# Patient Record
Sex: FEMALE | Race: BLACK OR AFRICAN AMERICAN | NOT HISPANIC OR LATINO | Employment: UNEMPLOYED | URBAN - METROPOLITAN AREA
[De-identification: names, ages, dates, MRNs, and addresses within clinical notes are randomized per-mention and may not be internally consistent; named-entity substitution may affect disease eponyms.]

---

## 2017-03-16 ENCOUNTER — OFFICE VISIT - HEALTHEAST (OUTPATIENT)
Dept: INTERNAL MEDICINE | Facility: CLINIC | Age: 32
End: 2017-03-16

## 2017-03-16 DIAGNOSIS — Z13.220 SCREENING FOR CHOLESTEROL LEVEL: ICD-10-CM

## 2017-03-16 DIAGNOSIS — R92.8 ABNORMAL ULTRASOUND OF BREAST: ICD-10-CM

## 2017-03-16 DIAGNOSIS — Z01.00 ROUTINE EYE EXAM: ICD-10-CM

## 2017-03-16 DIAGNOSIS — Z00.00 ROUTINE GENERAL MEDICAL EXAMINATION AT A HEALTH CARE FACILITY: ICD-10-CM

## 2017-03-16 DIAGNOSIS — Z30.015 ENCOUNTER FOR INITIAL PRESCRIPTION OF VAGINAL RING HORMONAL CONTRACEPTIVE: ICD-10-CM

## 2017-03-16 LAB
CHOLEST SERPL-MCNC: 199 MG/DL
FASTING STATUS PATIENT QL REPORTED: YES
HDLC SERPL-MCNC: 50 MG/DL
LDLC SERPL CALC-MCNC: 139 MG/DL
TRIGL SERPL-MCNC: 51 MG/DL

## 2017-03-16 ASSESSMENT — MIFFLIN-ST. JEOR: SCORE: 1437.8

## 2017-03-24 ENCOUNTER — HOSPITAL ENCOUNTER (OUTPATIENT)
Dept: MAMMOGRAPHY | Facility: CLINIC | Age: 32
Discharge: HOME | End: 2017-03-24
Attending: INTERNAL MEDICINE

## 2017-03-24 ENCOUNTER — HOSPITAL ENCOUNTER (OUTPATIENT)
Dept: ULTRASOUND IMAGING | Facility: CLINIC | Age: 32
Discharge: HOME | End: 2017-03-24
Attending: INTERNAL MEDICINE

## 2017-03-24 DIAGNOSIS — R92.8 ABNORMAL ULTRASOUND OF BREAST: ICD-10-CM

## 2017-05-23 ENCOUNTER — RECORDS - HEALTHEAST (OUTPATIENT)
Dept: ADMINISTRATIVE | Facility: OTHER | Age: 32
End: 2017-05-23

## 2018-06-27 PROBLEM — D57.3 SICKLE CELL TRAIT (H): Status: ACTIVE | Noted: 2018-06-27

## 2018-07-11 ENCOUNTER — OFFICE VISIT (OUTPATIENT)
Dept: FAMILY MEDICINE | Facility: CLINIC | Age: 33
End: 2018-07-11

## 2018-07-11 VITALS
SYSTOLIC BLOOD PRESSURE: 148 MMHG | WEIGHT: 173.38 LBS | HEART RATE: 68 BPM | TEMPERATURE: 98.4 F | OXYGEN SATURATION: 100 % | BODY MASS INDEX: 31.21 KG/M2 | DIASTOLIC BLOOD PRESSURE: 98 MMHG | RESPIRATION RATE: 16 BRPM

## 2018-07-11 DIAGNOSIS — Z01.818 PREOP GENERAL PHYSICAL EXAM: Primary | ICD-10-CM

## 2018-07-11 LAB
APTT PPP: 26 SEC (ref 22–37)
ERYTHROCYTE [DISTWIDTH] IN BLOOD BY AUTOMATED COUNT: 13.9 % (ref 10–15)
HCT VFR BLD AUTO: 39.1 % (ref 35–47)
HGB BLD-MCNC: 13.3 G/DL (ref 11.7–15.7)
INR PPP: 0.98 (ref 0.86–1.14)
MCH RBC QN AUTO: 26.5 PG (ref 26.5–33)
MCHC RBC AUTO-ENTMCNC: 34 G/DL (ref 31.5–36.5)
MCV RBC AUTO: 78 FL (ref 78–100)
PLATELET # BLD AUTO: 186 10E9/L (ref 150–450)
RBC # BLD AUTO: 5.02 10E12/L (ref 3.8–5.2)
WBC # BLD AUTO: 5.4 10E9/L (ref 4–11)

## 2018-07-11 PROCEDURE — 85610 PROTHROMBIN TIME: CPT | Performed by: FAMILY MEDICINE

## 2018-07-11 PROCEDURE — 36415 COLL VENOUS BLD VENIPUNCTURE: CPT | Performed by: FAMILY MEDICINE

## 2018-07-11 PROCEDURE — 99203 OFFICE O/P NEW LOW 30 MIN: CPT | Performed by: FAMILY MEDICINE

## 2018-07-11 PROCEDURE — 85027 COMPLETE CBC AUTOMATED: CPT | Performed by: FAMILY MEDICINE

## 2018-07-11 PROCEDURE — 80048 BASIC METABOLIC PNL TOTAL CA: CPT | Performed by: FAMILY MEDICINE

## 2018-07-11 PROCEDURE — 85730 THROMBOPLASTIN TIME PARTIAL: CPT | Performed by: FAMILY MEDICINE

## 2018-07-11 NOTE — MR AVS SNAPSHOT
After Visit Summary   7/11/2018    Zoila Forrester    MRN: [id number]           Patient Information     Date Of Birth          1985        Visit Information        Provider Department      7/11/2018 11:30 AM Jeanette López MD [facility]        Today's Diagnoses     Preop general physical exam    -  1      Care Instructions      Before Your Surgery      Call your surgeon if there is any change in your health. This includes signs of a cold or flu (such as a sore throat, runny nose, cough, rash or fever).    Do not smoke, drink alcohol or take over the counter medicine (unless your surgeon or primary care doctor tells you to) for the 24 hours before and after surgery.    If you take prescribed drugs: Follow your doctor s orders about which medicines to take and which to stop until after surgery. Avoid any NSAIDS at last 7-10 days prior to surgery. Tylenol only for pain.     Eating and drinking prior to surgery: follow the instructions from your surgeon    Take a shower or bath the night before surgery. Use the soap your surgeon gave you to gently clean your skin. If you do not have soap from your surgeon, use your regular soap. Do not shave or scrub the surgery site.  Wear clean pajamas and have clean sheets on your bed.           Follow-ups after your visit        Who to contact     If you have questions or need follow up information about today's clinic visit or your schedule please contact [facility] directly at [phone].  Normal or non-critical lab and imaging results will be communicated to you by MyChart, letter or phone within 4 business days after the clinic has received the results. If you do not hear from us within 7 days, please contact the clinic through MyChart or phone. If you have a critical or abnormal lab result, we will notify you by phone as soon as possible.  Submit refill requests through [employer] or call your pharmacy and  "they will forward the refill request to us. Please allow 3 business days for your refill to be completed.          Additional Information About Your Visit        [employer]a-Sha Information     [employer] lets you send messages to your doctor, view your test results, renew your prescriptions, schedule appointments and more. To sign up, go to [email or web address] . Click on \"Log in\" on the left side of the screen, which will take you to the Welcome page. Then click on \"Sign up Now\" on the right side of the page.     You will be asked to enter the access code listed below, as well as some personal information. Please follow the directions to create your username and password.     Your access code is: [email or web address]  Expires: 2018 12:24 PM     Your access code will  in 90 days. If you need help or a new code, please call your [facility] clinic or [phone].        Care EveryWhere ID     This is your [facility] EveryWhere ID. This could be used by other organizations to access your [facility] medical records  [id number]        Your Vitals Were     Pulse Temperature Respirations Last Period Pulse Oximetry BMI (Body Mass Index)    68 98.4  F (36.9  C) (Oral) 16 2018 (Exact Date) 100% 31.21 kg/m2       Blood Pressure from Last 3 Encounters:   18 (!) 148/98   18 (!) 71/53    Weight from Last 3 Encounters:   18 173 lb 6 oz (78.6 kg)   18 172 lb (78 kg)              We Performed the Following     Basic metabolic panel     CBC with platelets     INR     Partial thromboplastin time        Primary Care Provider Office Phone # Fax #    [facility] [phone] [address] [facility] [address]        Equal Access to Services     JESSICA NUNES : Nehal Corrales, ailyn paulson, marylin hammalaris nix. So [facility] [phone].    ATENCIÓN: Si habla español, tiene a moncada disposición servicios gratuitos de " asistencia lingüística. Tavo al [phone].    We comply with applicable federal civil rights laws and [place] laws. We do not discriminate on the basis of race, color, national origin, age, disability, sex, sexual orientation, or gender identity.            Thank you!     Thank you for choosing [facility]  for your care. Our goal is always to provide you with excellent care. Hearing back from our patients is one way we can continue to improve our services. Please take a few minutes to complete the written survey that you may receive in the mail after your visit with us. Thank you!             Your Updated Medication List - Protect others around you: Learn how to safely use, store and throw away your medicines at www.disposemymeds.org.      Notice  As of 7/11/2018 11:44 AM    You have not been prescribed any medications.

## 2018-07-11 NOTE — PATIENT INSTRUCTIONS
Before Your Surgery      Call your surgeon if there is any change in your health. This includes signs of a cold or flu (such as a sore throat, runny nose, cough, rash or fever).    Do not smoke, drink alcohol or take over the counter medicine (unless your surgeon or primary care doctor tells you to) for the 24 hours before and after surgery.    If you take prescribed drugs: Follow your doctor s orders about which medicines to take and which to stop until after surgery. Avoid any NSAIDS at last 7-10 days prior to surgery. Tylenol only for pain.     Eating and drinking prior to surgery: follow the instructions from your surgeon    Take a shower or bath the night before surgery. Use the soap your surgeon gave you to gently clean your skin. If you do not have soap from your surgeon, use your regular soap. Do not shave or scrub the surgery site.  Wear clean pajamas and have clean sheets on your bed.

## 2018-07-11 NOTE — PROGRESS NOTES
[facility]  [address] [address] [address]  [phone]  Dept: [phone]    PRE-OP EVALUATION:  Today's date: 2018    Zoila Forrester (: 1985) presents for pre-operative evaluation assessment as requested by Dr. Gonzales.  She requires evaluation and anesthesia risk assessment prior to undergoing surgery/procedure for treatment of liposuction .    Fax number for surgical facility:   Primary Physician: [facility]  Type of Anesthesia Anticipated: to be determined    Patient has a Health Care Directive or Living Will:  NO    Preop Questions 2018   Who is doing your surgery? Dr Gonzales   What are you having done? liposuction   Date of Surgery/Procedure: 18   1.  Do you have a history of Heart attack, stroke, stent, coronary bypass surgery, or other heart surgery? No   2.  Do you ever have any pain or discomfort in your chest? No   3.  Do you have a history of  Heart Failure? No   4.   Are you troubled by shortness of breath when:  walking on a level surface, or up a slight hill, or at night? No   5.  Do you currently have a cold, bronchitis or other respiratory infection? No   6.  Do you have a cough, shortness of breath, or wheezing? No   7.  Do you sometimes get pains in the calves of your legs when you walk? No   8. Do you or anyone in your family have previous history of blood clots? No   9.  Do you or does anyone in your family have a serious bleeding problem such as prolonged bleeding following surgeries or cuts? No   10. Have you ever had problems with anemia or been told to take iron pills? No   11. Have you had any abnormal blood loss such as black, tarry or bloody stools, or abnormal vaginal bleeding? No   12. Have you ever had a blood transfusion? No   13. Have you or any of your relatives ever had problems with anesthesia? No   14. Do you have sleep apnea, excessive snoring or daytime drowsiness? No   15. Do you have  any prosthetic heart valves? No   16. Do you have prosthetic joints? No   17. Is there any chance that you may be pregnant? No         HPI:     HPI related to upcoming procedure: liposuction       See problem list for active medical problems.  Problems all longstanding and stable, except as noted/documented.  See ROS for pertinent symptoms related to these conditions.                                                                                                                                                          .    MEDICAL HISTORY:     Patient Active Problem List    Diagnosis Date Noted     Sickle cell trait (H) 06/27/2018     Priority: Medium     Overview:   Created by Conversion        History reviewed. No pertinent past medical history.  History reviewed. No pertinent surgical history.  No current outpatient prescriptions on file.     OTC products: no recent use of OTC ASA, NSAIDS or Steroids    No Known Allergies   Latex Allergy: NO    Social History   Substance Use Topics     Smoking status: Never Smoker     Smokeless tobacco: Never Used     Alcohol use No     History   Drug Use No       REVIEW OF SYSTEMS:   Constitutional, neuro, ENT, endocrine, pulmonary, cardiac, gastrointestinal, genitourinary, musculoskeletal, integument and psychiatric systems are negative, except as otherwise noted.    EXAM:   BP (!) 148/98 (BP Location: Left arm, Patient Position: Chair, Cuff Size: Adult Regular)  Pulse 68  Temp 98.4  F (36.9  C) (Oral)  Resp 16  Wt 173 lb 6 oz (78.6 kg)  LMP 06/25/2018 (Exact Date)  SpO2 100%  BMI 31.21 kg/m2    GENERAL APPEARANCE: healthy, alert and no distress     EYES: EOMI, PERRL     HENT: ear canals and TM's normal and nose and mouth without ulcers or lesions     NECK: no adenopathy, no asymmetry, masses, or scars and thyroid normal to palpation     RESP: lungs clear to auscultation - no rales, rhonchi or wheezes     CV: regular rates and rhythm, normal S1 S2, no S3 or S4 and no  murmur, click or rub     ABDOMEN:  soft, nontender, no HSM or masses and bowel sounds normal     MS: extremities normal- no gross deformities noted, no evidence of inflammation in joints, FROM in all extremities.     SKIN: acne     NEURO: Normal strength and tone, sensory exam grossly normal, mentation intact and speech normal     PSYCH: mentation appears normal. and affect normal/bright     LYMPHATICS: No cervical adenopathy    DIAGNOSTICS:   EKG: Not indicated due to non-vascular surgery and low risk of event (age <65 and without cardiac risk factors)    No results for input(s): HGB, PLT, INR, NA, POTASSIUM, CR, A1C in the last 46327 hours.     IMPRESSION:   Reason for surgery/procedure: Liposuction   Diagnosis/reason for consult: Pre-operative consult     The proposed surgical procedure is considered INTERMEDIATE risk.    REVISED CARDIAC RISK INDEX  The patient has the following serious cardiovascular risks for perioperative complications such as (MI, PE, VFib and 3  AV Block):  No serious cardiac risks  INTERPRETATION: 0 risks: Class I (very low risk - 0.4% complication rate)    The patient has the following additional risks for perioperative complications:  No identified additional risks      ICD-10-CM    1. Preop general physical exam Z01.818 Basic metabolic panel     CBC with platelets       RECOMMENDATIONS:     APPROVAL GIVEN to proceed with proposed procedure, without further diagnostic evaluation       Signed Electronically by: Jeanette López MD    Copy of this evaluation report is provided to requesting physician.    [place] Preop Guidelines    Revised Cardiac Risk Index

## 2018-07-11 NOTE — LETTER
July 12, 2018      Zoila Usama  [address] [facility] [address]        Dear ,    We are writing to inform you of your test results.    Your test results fall within the expected range(s) or remain unchanged from previous results.  Please continue with current treatment plan.    Resulted Orders   Basic metabolic panel   Result Value Ref Range    Sodium 139 133 - 144 mmol/L    Potassium 3.8 3.4 - 5.3 mmol/L    Chloride 104 94 - 109 mmol/L    Carbon Dioxide 28 20 - 32 mmol/L    Anion Gap 7 3 - 14 mmol/L    Glucose 97 70 - 99 mg/dL    Urea Nitrogen 10 7 - 30 mg/dL    Creatinine 0.72 0.52 - 1.04 mg/dL    GFR Estimate >90 >60 mL/min/1.7m2      Comment:      Non  GFR Calc    GFR Estimate If Black >90 >60 mL/min/1.7m2      Comment:       GFR Calc    Calcium 8.7 8.5 - 10.1 mg/dL   CBC with platelets   Result Value Ref Range    WBC 5.4 4.0 - 11.0 10e9/L    RBC Count 5.02 3.8 - 5.2 10e12/L    Hemoglobin 13.3 11.7 - 15.7 g/dL    Hematocrit 39.1 35.0 - 47.0 %    MCV 78 78 - 100 fl    MCH 26.5 26.5 - 33.0 pg    MCHC 34.0 31.5 - 36.5 g/dL    RDW 13.9 10.0 - 15.0 %    Platelet Count 186 150 - 450 10e9/L   Partial thromboplastin time   Result Value Ref Range    PTT 26 22 - 37 sec   INR   Result Value Ref Range    INR 0.98 0.86 - 1.14       If you have any questions or concerns, please call the clinic at the number listed above.       Sincerely,        Jeanette López MD/дмитрий

## 2018-07-12 LAB
ANION GAP SERPL CALCULATED.3IONS-SCNC: 7 MMOL/L (ref 3–14)
BUN SERPL-MCNC: 10 MG/DL (ref 7–30)
CALCIUM SERPL-MCNC: 8.7 MG/DL (ref 8.5–10.1)
CHLORIDE SERPL-SCNC: 104 MMOL/L (ref 94–109)
CO2 SERPL-SCNC: 28 MMOL/L (ref 20–32)
CREAT SERPL-MCNC: 0.72 MG/DL (ref 0.52–1.04)
GFR SERPL CREATININE-BSD FRML MDRD: >90 ML/MIN/1.7M2
GLUCOSE SERPL-MCNC: 97 MG/DL (ref 70–99)
POTASSIUM SERPL-SCNC: 3.8 MMOL/L (ref 3.4–5.3)
SODIUM SERPL-SCNC: 139 MMOL/L (ref 133–144)

## 2018-07-12 NOTE — PROGRESS NOTES
Please send patient a letter to let them know results are normal.    Please fax pre-op and labs to Dr. young's office for liposuction.     Dr. Ordaz

## 2018-11-15 ENCOUNTER — OFFICE VISIT - HEALTHEAST (OUTPATIENT)
Dept: INTERNAL MEDICINE | Facility: CLINIC | Age: 33
End: 2018-11-15

## 2018-11-15 DIAGNOSIS — I10 ESSENTIAL HYPERTENSION: ICD-10-CM

## 2018-11-15 DIAGNOSIS — Z00.00 ROUTINE GENERAL MEDICAL EXAMINATION AT A HEALTH CARE FACILITY: ICD-10-CM

## 2018-11-15 DIAGNOSIS — D24.2 BREAST ADENOMA, LEFT: ICD-10-CM

## 2018-11-15 DIAGNOSIS — E78.00 ELEVATED CHOLESTEROL: ICD-10-CM

## 2018-11-15 LAB
ALBUMIN SERPL-MCNC: 3.9 G/DL (ref 3.5–5)
ALP SERPL-CCNC: 51 U/L (ref 45–120)
ALT SERPL W P-5'-P-CCNC: 21 U/L (ref 0–45)
ANION GAP SERPL CALCULATED.3IONS-SCNC: 10 MMOL/L (ref 5–18)
AST SERPL W P-5'-P-CCNC: 22 U/L (ref 0–40)
ATRIAL RATE - MUSE: 68 BPM
BILIRUB SERPL-MCNC: 0.3 MG/DL (ref 0–1)
BUN SERPL-MCNC: 9 MG/DL (ref 8–22)
CALCIUM SERPL-MCNC: 9.4 MG/DL (ref 8.5–10.5)
CHLORIDE BLD-SCNC: 106 MMOL/L (ref 98–107)
CHOLEST SERPL-MCNC: 192 MG/DL
CO2 SERPL-SCNC: 23 MMOL/L (ref 22–31)
CREAT SERPL-MCNC: 0.72 MG/DL (ref 0.6–1.1)
DIASTOLIC BLOOD PRESSURE - MUSE: NORMAL MMHG
FASTING STATUS PATIENT QL REPORTED: YES
GFR SERPL CREATININE-BSD FRML MDRD: >60 ML/MIN/1.73M2
GLUCOSE BLD-MCNC: 86 MG/DL (ref 70–125)
HDLC SERPL-MCNC: 50 MG/DL
INTERPRETATION ECG - MUSE: NORMAL
LDLC SERPL CALC-MCNC: 128 MG/DL
P AXIS - MUSE: 61 DEGREES
POTASSIUM BLD-SCNC: 4.1 MMOL/L (ref 3.5–5)
PR INTERVAL - MUSE: 140 MS
PROT SERPL-MCNC: 7.5 G/DL (ref 6–8)
QRS DURATION - MUSE: 74 MS
QT - MUSE: 378 MS
QTC - MUSE: 401 MS
R AXIS - MUSE: 38 DEGREES
SODIUM SERPL-SCNC: 139 MMOL/L (ref 136–145)
SYSTOLIC BLOOD PRESSURE - MUSE: NORMAL MMHG
T AXIS - MUSE: 24 DEGREES
TRIGL SERPL-MCNC: 69 MG/DL
TSH SERPL DL<=0.005 MIU/L-ACNC: 1.06 UIU/ML (ref 0.3–5)
VENTRICULAR RATE- MUSE: 68 BPM

## 2018-11-15 ASSESSMENT — MIFFLIN-ST. JEOR: SCORE: 1437.06

## 2018-11-26 ENCOUNTER — HOSPITAL ENCOUNTER (OUTPATIENT)
Dept: MAMMOGRAPHY | Facility: CLINIC | Age: 33
Discharge: HOME | End: 2018-11-26
Attending: INTERNAL MEDICINE

## 2018-11-26 ENCOUNTER — HOSPITAL ENCOUNTER (OUTPATIENT)
Dept: ULTRASOUND IMAGING | Facility: CLINIC | Age: 33
Discharge: HOME | End: 2018-11-26
Attending: INTERNAL MEDICINE

## 2018-11-26 DIAGNOSIS — D24.2 BREAST ADENOMA, LEFT: ICD-10-CM

## 2019-01-31 ENCOUNTER — OFFICE VISIT - HEALTHEAST (OUTPATIENT)
Dept: FAMILY MEDICINE | Facility: CLINIC | Age: 34
End: 2019-01-31

## 2019-01-31 DIAGNOSIS — S51.812D LACERATION OF LEFT FOREARM, SUBSEQUENT ENCOUNTER: ICD-10-CM

## 2019-01-31 DIAGNOSIS — Z48.02 ENCOUNTER FOR REMOVAL OF SUTURES: ICD-10-CM

## 2019-05-01 ENCOUNTER — COMMUNICATION - HEALTHEAST (OUTPATIENT)
Dept: INTERNAL MEDICINE | Facility: CLINIC | Age: 34
End: 2019-05-01

## 2019-05-01 DIAGNOSIS — I10 ESSENTIAL HYPERTENSION: ICD-10-CM

## 2019-05-16 ENCOUNTER — OFFICE VISIT (OUTPATIENT)
Dept: INTERNAL MEDICINE | Facility: CLINIC | Age: 34
End: 2019-05-16
Payer: COMMERCIAL

## 2019-05-16 VITALS
SYSTOLIC BLOOD PRESSURE: 145 MMHG | DIASTOLIC BLOOD PRESSURE: 98 MMHG | HEART RATE: 92 BPM | RESPIRATION RATE: 14 BRPM | HEIGHT: 63 IN | OXYGEN SATURATION: 97 % | TEMPERATURE: 99.1 F | WEIGHT: 177 LBS | BODY MASS INDEX: 31.36 KG/M2

## 2019-05-16 DIAGNOSIS — Z01.818 PREOP GENERAL PHYSICAL EXAM: Primary | ICD-10-CM

## 2019-05-16 LAB — HGB BLD-MCNC: 12 G/DL (ref 11.7–15.7)

## 2019-05-16 PROCEDURE — 99214 OFFICE O/P EST MOD 30 MIN: CPT | Performed by: FAMILY MEDICINE

## 2019-05-16 PROCEDURE — 36415 COLL VENOUS BLD VENIPUNCTURE: CPT | Performed by: FAMILY MEDICINE

## 2019-05-16 PROCEDURE — 85018 HEMOGLOBIN: CPT | Performed by: FAMILY MEDICINE

## 2019-05-16 ASSESSMENT — MIFFLIN-ST. JEOR: SCORE: 1469.06

## 2019-05-16 NOTE — PROGRESS NOTES
[facility]  [address]  [facility] [address]  [phone]  Dept: [phone]    PRE-OP EVALUATION:  Today's date: 2019    Zoila Mcneal (: 1985) presents for pre-operative evaluation assessment as requested by Dr. Abisai Hebert.  She requires evaluation and anesthesia risk assessment prior to undergoing surgery/procedure for treatment of abdominoplasty .    Fax number for surgical facility: [phone]  [facility]  Type of Anesthesia Anticipated: to be determined    Patient has a Health Care Directive or Living Will:  NO    Preop Questions 2019   Who is doing your surgery? abdominal plasty   What are you having done? great   Date of Surgery/Procedure: 24th may 2019   Facility or Hospital where procedure/surgery will be performed: [facility]   1.  Do you have a history of Heart attack, stroke, stent, coronary bypass surgery, or other heart surgery? No   2.  Do you ever have any pain or discomfort in your chest? No   3.  Do you have a history of  Heart Failure? No   4.   Are you troubled by shortness of breath when:  walking on a level surface, or up a slight hill, or at night? No   5.  Do you currently have a cold, bronchitis or other respiratory infection? No   6.  Do you have a cough, shortness of breath, or wheezing? No   7.  Do you sometimes get pains in the calves of your legs when you walk? No   8. Do you or anyone in your family have previous history of blood clots? No   9.  Do you or does anyone in your family have a serious bleeding problem such as prolonged bleeding following surgeries or cuts? No   10. Have you ever had problems with anemia or been told to take iron pills? No   11. Have you had any abnormal blood loss such as black, tarry or bloody stools, or abnormal vaginal bleeding? No   12. Have you ever had a blood transfusion? No   13. Have you or any of your relatives ever had problems with  anesthesia? No   14. Do you have sleep apnea, excessive snoring or daytime drowsiness? No   15. Do you have any prosthetic heart valves? No   16. Do you have prosthetic joints? No   17. Is there any chance that you may be pregnant? No         HPI:     HPI related to upcoming procedure: pt would have abdominoplasty for cosmetic reasons       See problem list for active medical problems.  Problems all longstanding and stable, except as noted/documented.  See ROS for pertinent symptoms related to these conditions.                                                                                                                                                          .    MEDICAL HISTORY:     Patient Active Problem List    Diagnosis Date Noted     Sickle cell trait (H) 06/27/2018     Priority: Medium     Overview:   Created by Conversion        History reviewed. No pertinent past medical history.  History reviewed. No pertinent surgical history.  No current outpatient medications on file.     OTC products: no recent use of OTC ASA, NSAIDS or Steroids    No Known Allergies   Latex Allergy: NO    Social History     Tobacco Use     Smoking status: Never Smoker     Smokeless tobacco: Never Used   Substance Use Topics     Alcohol use: No     History   Drug Use No       REVIEW OF SYSTEMS:   CONSTITUTIONAL: NEGATIVE for fever, chills, change in weight  INTEGUMENTARY/SKIN: NEGATIVE for worrisome rashes, moles or lesions  EYES: NEGATIVE for vision changes or irritation  ENT/MOUTH: NEGATIVE for ear, mouth and throat problems  RESP: NEGATIVE for significant cough or SOB  BREAST: NEGATIVE for masses, tenderness or discharge  CV: NEGATIVE for chest pain, palpitations or peripheral edema  GI: NEGATIVE for nausea, abdominal pain, heartburn, or change in bowel habits  : NEGATIVE for frequency, dysuria, or hematuria  MUSCULOSKELETAL: NEGATIVE for significant arthralgias or myalgia  NEURO: NEGATIVE for weakness, dizziness or  "paresthesias  ENDOCRINE: NEGATIVE for temperature intolerance, skin/hair changes  HEME: NEGATIVE for bleeding problems  PSYCHIATRIC: NEGATIVE for changes in mood or affect    EXAM:   BP (!) 145/98 (BP Location: Right arm, Patient Position: Sitting, Cuff Size: Adult Large)   Pulse 92   Temp 99.1  F (37.3  C) (Oral)   Resp 14   Ht 1.588 m (5' 2.5\")   Wt 80.3 kg (177 lb)   LMP 05/05/2019 (Approximate)   SpO2 97%   Breastfeeding? No   BMI 31.86 kg/m      GENERAL APPEARANCE: healthy, alert and no distress     EYES: EOMI, PERRL     HENT: ear canals and TM's normal and nose and mouth without ulcers or lesions     NECK: no adenopathy, no asymmetry, masses, or scars and thyroid normal to palpation     RESP: lungs clear to auscultation - no rales, rhonchi or wheezes     CV: regular rates and rhythm, normal S1 S2, no S3 or S4 and no murmur, click or rub     ABDOMEN:  soft, nontender, no HSM or masses and bowel sounds normal     MS: extremities normal- no gross deformities noted, no evidence of inflammation in joints, FROM in all extremities.     SKIN: no suspicious lesions or rashes     NEURO: Normal strength and tone, sensory exam grossly normal, mentation intact and speech normal     PSYCH: mentation appears normal. and affect normal/bright     LYMPHATICS: No cervical adenopathy    DIAGNOSTICS:   Hemoglobin   Recent Labs   Lab Test 07/11/18  1153   HGB 13.3      INR 0.98      POTASSIUM 3.8   CR 0.72        IMPRESSION:   Reason for surgery/procedure: cosmetic surgery  Diagnosis/reason for consult:preop prior to abdominoplasty    The proposed surgical procedure is considered INTERMEDIATE risk.    REVISED CARDIAC RISK INDEX  The patient has the following serious cardiovascular risks for perioperative complications such as (MI, PE, VFib and 3  AV Block):  No serious cardiac risks  INTERPRETATION: 0 risks: Class I (very low risk - 0.4% complication rate)    The patient has the following additional risks for " perioperative complications:  No identified additional risks      ICD-10-CM    1. Preop general physical exam Z01.818        RECOMMENDATIONS:     --Consult hospital rounder / IM to assist post-op medical management    --Patient is to take all scheduled medications on the day of surgery EXCEPT for modifications listed below.    APPROVAL GIVEN to proceed with proposed procedure, without further diagnostic evaluation     Hemoglobin -  Results for orders placed or performed in visit on 05/16/19   Hemoglobin   Result Value Ref Range    Hemoglobin 12.0 11.7 - 15.7 g/dL       Signed Electronically by: Amirah Cahn MD    Copy of this evaluation report is provided to requesting physician.    [place] Preop Guidelines    Revised Cardiac Risk Index

## 2019-05-22 DIAGNOSIS — Z01.818 PRE-OPERATIVE EXAMINATION: Primary | ICD-10-CM

## 2019-05-22 PROCEDURE — 36415 COLL VENOUS BLD VENIPUNCTURE: CPT | Performed by: PLASTIC SURGERY

## 2019-05-22 PROCEDURE — 84132 ASSAY OF SERUM POTASSIUM: CPT | Performed by: PLASTIC SURGERY

## 2019-05-23 LAB — POTASSIUM SERPL-SCNC: 3.4 MMOL/L (ref 3.4–5.3)

## 2019-05-24 ENCOUNTER — NURSE TRIAGE (OUTPATIENT)
Dept: NURSING | Facility: CLINIC | Age: 34
End: 2019-05-24

## 2020-03-02 ENCOUNTER — OFFICE VISIT - HEALTHEAST (OUTPATIENT)
Dept: INTERNAL MEDICINE | Facility: CLINIC | Age: 35
End: 2020-03-02

## 2020-03-02 DIAGNOSIS — Z11.51 SPECIAL SCREENING EXAMINATION FOR HUMAN PAPILLOMAVIRUS (HPV): ICD-10-CM

## 2020-03-02 DIAGNOSIS — Z13.228 SCREENING FOR ENDOCRINE, NUTRITIONAL, METABOLIC AND IMMUNITY DISORDER: ICD-10-CM

## 2020-03-02 DIAGNOSIS — Z13.0 SCREENING FOR ENDOCRINE, NUTRITIONAL, METABOLIC AND IMMUNITY DISORDER: ICD-10-CM

## 2020-03-02 DIAGNOSIS — Z13.220 SCREENING FOR CHOLESTEROL LEVEL: ICD-10-CM

## 2020-03-02 DIAGNOSIS — Z00.00 ENCOUNTER FOR HEALTH MAINTENANCE EXAMINATION IN ADULT: ICD-10-CM

## 2020-03-02 DIAGNOSIS — N63.0 FIBROUS BREAST LUMPS: ICD-10-CM

## 2020-03-02 DIAGNOSIS — Z13.29 SCREENING FOR ENDOCRINE, NUTRITIONAL, METABOLIC AND IMMUNITY DISORDER: ICD-10-CM

## 2020-03-02 DIAGNOSIS — Z13.21 SCREENING FOR ENDOCRINE, NUTRITIONAL, METABOLIC AND IMMUNITY DISORDER: ICD-10-CM

## 2020-03-02 DIAGNOSIS — Z86.79 HISTORY OF HYPERTENSION: ICD-10-CM

## 2020-03-02 LAB
ALBUMIN SERPL-MCNC: 3.8 G/DL (ref 3.5–5)
ALP SERPL-CCNC: 48 U/L (ref 45–120)
ALT SERPL W P-5'-P-CCNC: 22 U/L (ref 0–45)
ANION GAP SERPL CALCULATED.3IONS-SCNC: 8 MMOL/L (ref 5–18)
AST SERPL W P-5'-P-CCNC: 18 U/L (ref 0–40)
BASOPHILS # BLD AUTO: 0 THOU/UL (ref 0–0.2)
BASOPHILS NFR BLD AUTO: 1 % (ref 0–2)
BILIRUB SERPL-MCNC: 0.3 MG/DL (ref 0–1)
BUN SERPL-MCNC: 7 MG/DL (ref 8–22)
CALCIUM SERPL-MCNC: 9.3 MG/DL (ref 8.5–10.5)
CHLORIDE BLD-SCNC: 104 MMOL/L (ref 98–107)
CHOLEST SERPL-MCNC: 179 MG/DL
CO2 SERPL-SCNC: 26 MMOL/L (ref 22–31)
CREAT SERPL-MCNC: 0.76 MG/DL (ref 0.6–1.1)
EOSINOPHIL # BLD AUTO: 0.1 THOU/UL (ref 0–0.4)
EOSINOPHIL NFR BLD AUTO: 1 % (ref 0–6)
ERYTHROCYTE [DISTWIDTH] IN BLOOD BY AUTOMATED COUNT: 13.9 % (ref 11–14.5)
FASTING STATUS PATIENT QL REPORTED: YES
GFR SERPL CREATININE-BSD FRML MDRD: >60 ML/MIN/1.73M2
GLUCOSE BLD-MCNC: 88 MG/DL (ref 70–125)
HCT VFR BLD AUTO: 38.4 % (ref 35–47)
HDLC SERPL-MCNC: 51 MG/DL
HGB BLD-MCNC: 12.5 G/DL (ref 12–16)
LDLC SERPL CALC-MCNC: 117 MG/DL
LYMPHOCYTES # BLD AUTO: 2.2 THOU/UL (ref 0.8–4.4)
LYMPHOCYTES NFR BLD AUTO: 46 % (ref 20–40)
MCH RBC QN AUTO: 25.3 PG (ref 27–34)
MCHC RBC AUTO-ENTMCNC: 32.6 G/DL (ref 32–36)
MCV RBC AUTO: 78 FL (ref 80–100)
MONOCYTES # BLD AUTO: 0.3 THOU/UL (ref 0–0.9)
MONOCYTES NFR BLD AUTO: 7 % (ref 2–10)
NEUTROPHILS # BLD AUTO: 2.2 THOU/UL (ref 2–7.7)
NEUTROPHILS NFR BLD AUTO: 45 % (ref 50–70)
PLATELET # BLD AUTO: 182 THOU/UL (ref 140–440)
PMV BLD AUTO: 12.8 FL (ref 8.5–12.5)
POTASSIUM BLD-SCNC: 4.2 MMOL/L (ref 3.5–5)
PROT SERPL-MCNC: 7.4 G/DL (ref 6–8)
RBC # BLD AUTO: 4.94 MILL/UL (ref 3.8–5.4)
SODIUM SERPL-SCNC: 138 MMOL/L (ref 136–145)
TRIGL SERPL-MCNC: 54 MG/DL
TSH SERPL DL<=0.005 MIU/L-ACNC: 1.2 UIU/ML (ref 0.3–5)
WBC: 4.8 THOU/UL (ref 4–11)

## 2020-03-02 ASSESSMENT — MIFFLIN-ST. JEOR: SCORE: 1404.03

## 2020-03-03 LAB
HPV SOURCE: NORMAL
HUMAN PAPILLOMA VIRUS 16 DNA: NEGATIVE
HUMAN PAPILLOMA VIRUS 18 DNA: NEGATIVE
HUMAN PAPILLOMA VIRUS FINAL DIAGNOSIS: NORMAL
HUMAN PAPILLOMA VIRUS OTHER HR: NEGATIVE
SPECIMEN DESCRIPTION: NORMAL

## 2020-05-13 ENCOUNTER — COMMUNICATION - HEALTHEAST (OUTPATIENT)
Dept: INTERNAL MEDICINE | Facility: CLINIC | Age: 35
End: 2020-05-13

## 2020-05-13 DIAGNOSIS — I10 ESSENTIAL HYPERTENSION: ICD-10-CM

## 2020-06-26 ENCOUNTER — COMMUNICATION - HEALTHEAST (OUTPATIENT)
Dept: INTERNAL MEDICINE | Facility: CLINIC | Age: 35
End: 2020-06-26

## 2020-06-26 DIAGNOSIS — I10 ESSENTIAL HYPERTENSION: ICD-10-CM

## 2020-07-08 ENCOUNTER — COMMUNICATION - HEALTHEAST (OUTPATIENT)
Dept: INTERNAL MEDICINE | Facility: CLINIC | Age: 35
End: 2020-07-08

## 2020-07-08 DIAGNOSIS — I10 ESSENTIAL HYPERTENSION: ICD-10-CM

## 2020-07-09 RX ORDER — CHLORTHALIDONE 25 MG/1
25 TABLET ORAL DAILY
Qty: 90 TABLET | Refills: 3 | Status: SHIPPED | OUTPATIENT
Start: 2020-07-09

## 2020-07-14 RX ORDER — CHLORTHALIDONE 25 MG/1
TABLET ORAL
Qty: 90 TABLET | Refills: 1 | Status: SHIPPED | OUTPATIENT
Start: 2020-07-14

## 2020-07-29 ENCOUNTER — HOSPITAL ENCOUNTER (OUTPATIENT)
Dept: MAMMOGRAPHY | Facility: CLINIC | Age: 35
Discharge: HOME | End: 2020-07-29

## 2020-07-29 ENCOUNTER — HOSPITAL ENCOUNTER (OUTPATIENT)
Dept: ULTRASOUND IMAGING | Facility: CLINIC | Age: 35
Discharge: HOME | End: 2020-07-29

## 2020-07-29 DIAGNOSIS — N63.0 FIBROUS BREAST LUMPS: ICD-10-CM

## 2020-07-30 ENCOUNTER — COMMUNICATION - HEALTHEAST (OUTPATIENT)
Dept: INTERNAL MEDICINE | Facility: CLINIC | Age: 35
End: 2020-07-30

## 2021-05-28 NOTE — TELEPHONE ENCOUNTER
Medication Question or Clarification  Who is calling: Patient  What medication are you calling about? (include dose and sig) chlorthalidone 25 mg  Who prescribed the medication?: Dr Lena Castillo  What is your question/concern?: The patient has not been taking this medication daily.  She only takes it if she feels uneasy or b/p systolic is greater then 140.  She is asking for a refill.  Please advise.   Pharmacy: [facility]  Okay to leave a detailed message?: No  Site CMT - Please call the pharmacy to obtain any additional needed information.

## 2021-05-30 VITALS — HEIGHT: 63 IN | BODY MASS INDEX: 30.22 KG/M2 | WEIGHT: 170.56 LBS

## 2021-06-02 VITALS — BODY MASS INDEX: 30.19 KG/M2 | WEIGHT: 170.4 LBS | HEIGHT: 63 IN

## 2021-06-02 VITALS — WEIGHT: 164 LBS | BODY MASS INDEX: 29.05 KG/M2

## 2021-06-04 VITALS
HEIGHT: 63 IN | BODY MASS INDEX: 29.1 KG/M2 | SYSTOLIC BLOOD PRESSURE: 144 MMHG | WEIGHT: 164.25 LBS | DIASTOLIC BLOOD PRESSURE: 80 MMHG

## 2021-06-08 NOTE — TELEPHONE ENCOUNTER
chlorthalidone (HYGROTEN) 25 MG tablet [[id number]]     Electronically signed by: Rolanda Villegas MD on 05/01/19 1423  Status: Discontinued    Ordering user: Rolanda Villegas MD 05/01/19 1423  Authorized by: Rolanda Villegas MD    Frequency: DAILY 05/01/19 - 03/02/20  Released by: Rolanda Villegas MD 05/01/19 1423    Discontinued by: Palak Ann, CNP 03/02/20 1011 [Therapy completed]

## 2021-06-09 NOTE — TELEPHONE ENCOUNTER
Medication Request   Medication name: chlorthalidone 25 MG     Requested Pharmacy: [facility]  Reason for request: Pt request  When did you use medication last?:  N/A  Patient offered appointment:  No  Okay to leave a detailed message: no

## 2021-06-09 NOTE — TELEPHONE ENCOUNTER
Please call and ask her if she was still taking it on a daily basis. If I am rembering correctly she was taking it PRN, and we discussed that PRN use wasn't a good idea. Thanks!

## 2021-06-09 NOTE — PROGRESS NOTES
ASSESSMENT and PLAN:  1. Encounter for initial prescription of vaginal ring hormonal contraceptive  She had an IUD and it gave her acne.  She didn't do well w/ oral pills--she doesn't remember to take them daily.  - etonogestrel-ethinyl estradiol (NUVARING) 0.12-0.015 mg/24 hr vaginal ring; Insert one (1) ring vaginally and leave in place for three (3) weeks, then remove for one (1) week.  Dispense: 3 each; Refill: 3    2. Routine eye exam  She's due for f/u.  - Ambulatory referral to Ophthalmology    3. Screening for cholesterol level  She would like to get this rechecked.  - Comprehensive Metabolic Panel  - Lipid Cascade    4. Routine general medical examination at a health care facility  Pap is UTD.  We discussed contraception and the nuvaring.      Patient Instructions   Please set up your breast u/s:  [phone]  [facility]:         Orders Placed This Encounter   Procedures     Comprehensive Metabolic Panel     Lipid Cascade     Order Specific Question:   Fasting is required?     Answer:   Yes     Ambulatory referral to Ophthalmology     Referral Priority:   Routine     Referral Type:   Consultation     Referral Reason:   Evaluation and Treatment     Requested Specialty:   Ophthalmology     Number of Visits Requested:   1     There are no discontinued medications.    No Follow-up on file.    ASSESSED PROBLEMS:  Problem List Items Addressed This Visit     None      Visit Diagnoses     Routine general medical examination at a health care facility    -  Primary    Encounter for initial prescription of vaginal ring hormonal contraceptive        Relevant Medications    etonogestrel-ethinyl estradiol (NUVARING) 0.12-0.015 mg/24 hr vaginal ring    Routine eye exam        Relevant Orders    Ambulatory referral to Ophthalmology    Screening for cholesterol level        Relevant Orders    Comprehensive Metabolic Panel    Lipid Cascade          CHIEF COMPLAINT:  Chief Complaint   Patient presents with     Annual Exam      Fasting, no pap       HISTORY OF PRESENT ILLNESS:  Zoila Forrester is a 31 y.o. female is presenting to the clinic today for an annual exam. She is fasting.     Health Maintenance: She was unable to follow up and set up a diagnostic mammogram. She is planning on setting up an appointment with an ophthalmologist.     REVIEW OF SYSTEMS:   She is no longer having pain in her breast. A mass was found in her breast, and was recommended to have a diagnostic mammogram. She denies shortness of breath or chest pain. She is still suffering from dry eyes. She is not currently on birth control, but has talked to someone at [facility] about birthcontrol. She did have an IUD, but it was taken out because she was experiencing acne and other symptoms. She is willing to try the Nuvaring. She denies masses, lumps, bumps in her breasts. She denies nipple discharge or drainage. All other systems are negative.    PFSH:  She denies family history of breast cancer, diabetes, colon cancer, and heart disease. She has 3 children. She denies family history of blood clots. She is a non-smoker.     No past medical history on file.  Past Surgical History:   Procedure Laterality Date      SECTION, LOW TRANSVERSE       Family History   Problem Relation Age of Onset     Diabetes Neg Hx      Breast cancer Neg Hx      Colon cancer Neg Hx      Heart disease Neg Hx      Clotting disorder Neg Hx      Social History     Social History     Marital status:      Spouse name: N/A     Number of children: N/A     Years of education: N/A     Occupational History     Not on file.     Social History Main Topics     Smoking status: Never Smoker     Smokeless tobacco: Never Used     Alcohol use No     Drug use: No     Sexual activity: Yes     Partners: Male     Other Topics Concern     Not on file     Social History Narrative       VITALS:  Vitals:    17 1006   BP: 112/80   Patient Site: Right Arm   Pulse: 76  "  Weight: 170 lb 9 oz (77.4 kg)   Height: 5' 3\" (1.6 m)     Wt Readings from Last 3 Encounters:   03/16/17 170 lb 9 oz (77.4 kg)   09/06/16 178 lb (80.7 kg)   01/13/16 166 lb 12.8 oz (75.7 kg)       PHYSICAL EXAM:  Constitutional:  Reveals an alert, pleasant middle aged female.   Vitals:  Noted.   Head: Normocephalic, without obvious abnormality, atraumatic   Eyes: PERRL, conjunctiva/corneas clear, EOM's intact   Throat: Lips, mucosa, and tongue normal; teeth and gums normal   Neck: Normal ROM, no thyromegaly, no carotid bruits  Lungs: Clear to auscultation bilaterally, respirations unlabored.   Heart: Regular rate and rhythm, S1 and S2 normal, no murmur, rub, or gallop,  Breast exam:  Normal skin overlying her breasts bilaterally no discrete nodule is palpable in the axilla bilaterally   Abdomen: Soft, non-tender, bowel sounds active all four quadrants, no masses, no organomegaly   Extremities: Extremities normal, atraumatic, no cyanosis or edema   Skin: Skin color, texture, turgor normal, no rashes or lesions   Neurologic: Normal    ADDITIONAL HISTORY SUMMARIZED (2): Reviewed note from 1/13/16 regarding conjunctivitis.   DECISION TO OBTAIN EXTRA INFORMATION (1): None.   RADIOLOGY TESTS (1): None.  LABS (1): Reviewed labs from 1/13/16. Ordered labs.   MEDICINE TESTS (1): None.  INDEPENDENT REVIEW (2 each): None.     The visit lasted a total of 11 minutes face to face with the patient. Over 50% of the time was spent counseling and educating the patient about birth control.    I, Hellen Edwards, am scribing for and in the presence of, Dr. Lena Castillo.    I, Dr. Lena Castillo, personally performed the services described in this documentation, as scribed by Hellen Edwards in my presence, and it is both accurate and complete.    MEDICATIONS:  Current Outpatient Prescriptions   Medication Sig Dispense Refill     etonogestrel-ethinyl estradiol (NUVARING) 0.12-0.015 mg/24 hr vaginal ring Insert one (1) ring " vaginally and leave in place for three (3) weeks, then remove for one (1) week. 3 each 3     No current facility-administered medications for this visit.        Total data points: 3

## 2021-06-09 NOTE — TELEPHONE ENCOUNTER
Left voicemail for patient to return call to clinic. When patient returns call, please give them below message.    Farnaz Brunson CMA ............... 12:20 PM, 07/09/20

## 2021-06-09 NOTE — TELEPHONE ENCOUNTER
Patient Returning Call  Reason for call:  Call back  Information relayed to patient:  Writer relayed message to Pt: Please call and ask her if she was still taking it on a daily basis. If I am rembering correctly she was taking it PRN, and we discussed that PRN use wasn't a good idea. Thanks!  Pt states she is taking medication daily.  Pt has been checking her blood pressure and it has been ranging normal to high, so to keep her numbers low she takes daily.  Please advise.  Patient has additional questions:  No  If YES, what are your questions/concerns:  N/A  Okay to leave a detailed message?: No

## 2021-06-09 NOTE — TELEPHONE ENCOUNTER
Who is calling:  Patient   Reason for Call:  Patient would like to know why her mediation was discontinued? Is there a replacement medication? She was not notified about this please advise    Date of last appointment with primary care:   Okay to leave a detailed message: Yes

## 2021-06-17 NOTE — PATIENT INSTRUCTIONS - HE
"Patient Instructions by Preston Frias MD at 1/31/2019  1:10 PM     Author: Preston Frias MD Service: -- Author Type: Physician    Filed: 1/31/2019  1:40 PM Encounter Date: 1/31/2019 Status: Addendum    : Preston Frias MD (Physician)    Related Notes: Original Note by Preston Frias MD (Physician) filed at 1/31/2019  1:40 PM         Patient Education     Suture or Staple Removal  You were seen today for a suture or staple removal. Your wound is healing as expected. The wound has healed well enough that the sutures or staples can be removed. The wound will continue to heal for the next few months.  At this time there is no sign of infection.   Home care    If you have pain, take pain medicine as advised by your healthcare provider.     Keep your wound clean and protected by covering it with a bandage for the next week or so.     Wash your hands with soap and warm water before and after caring for your wound. This helps prevent infection.    Clean the wound gently with soap and warm water daily or as directed by your [place] health care provider. Do not use iodine, alcohol, or other cleansers on the wound.  Gently pat it dry. Put on a new bandage, if needed. Do not reuse bandages.    If the area gets wet, gently pat it dry with a clean cloth. Replace the wet bandage with a dry one.    Check the wound daily for signs of infection. (These are listed under \"When to seek medical advice\" below.)    You may shower and bathe as usual. Swimming is now permitted.  Follow-up care  Follow up with your healthcare provider as advised.  When to seek medical advice   Call your healthcare provider if any of the following occur:    Wound reopens or bleeds    Signs of an infection, such as:  ? Increasing redness or swelling around the wound  ? Increased warmth from the wound  ? Worsening pain  ? Red streaking lines away from the wound  ? Fluid draining from the wound    Fever of 100.4 F (38 C) or higher, or " as directed by your child's healthcare provider  Date Last Reviewed: 9/27/2015 2000-2017 [employer] [address], [place] [address]. All rights reserved. This information is not intended as a substitute for professional medical care. Always follow your healthcare professional's instructions.

## 2021-06-18 NOTE — PATIENT INSTRUCTIONS - HE
Patient Instructions by Palak Ann CNP at 3/2/2020  9:30 AM     Author: Palak Ann CNP Service: -- Author Type: Nurse Practitioner    Filed: 3/2/2020 10:02 AM Encounter Date: 3/2/2020 Status: Addendum    : Palak Ann CNP (Nurse Practitioner)    Related Notes: Original Note by Palak Ann CNP (Nurse Practitioner) filed at 3/2/2020 10:01 AM       Your lab work is pending at this time.  The results will be released to my chart within 24 to 48 hours.  I will message you if anything comes back abnormal.    Your mammogram has been ordered.    See the attached information for a heart healthy diet.  Continue monitoring your blood pressure at home.  Goals are less than 140/90.  If your blood pressure continues to be over this please update me and we will discuss starting medicine for this.    Continue exercising, 3-5 times per week, 30 to 60 minutes at a time.    Continue to push her water intake.    I will plan on seeing you back in 1 year, please let me know if anything comes up before then.  Patient Education     Eating Heart-Healthy Food: Using the DASH Plan    Eating for your heart doesnt have to be hard or boring. You just need to know how to make healthier choices. The DASH eating plan has been developed to help you do just that. DASH stands for Dietary Approaches to Stop Hypertension. It is a plan that has been proven to be healthier for your heart and to lower your risk for high blood pressure. It can also help lower your risk for cancer, heart disease, osteoporosis, and diabetes.  Choosing from each food group  Choose foods from each of the food groups below each day. Try to get the recommended number of servings for each food group. The serving numbers are based on a diet of 2,000 calories a day. Talk to your doctor if youre unsure about your calorie needs. Along with getting the correct servings, the DASH plan also recommends a sodium intake less  than 2,300 mg per day.        Grains  Servings: 6 to 8 a day  A serving is:    1 slice bread    1 ounce dry cereal    Half a cup cooked rice, pasta or cereal  Best choices: Whole grains and any grains high in fiber. Vegetables  Servings: 4 to 5 a day  A serving is:    1 cup raw leafy vegetable    Half a cup cut-up raw or cooked vegetable    Half a cup vegetable juice  Best choices: Fresh or frozen vegetables prepared without added salt or fat.   Fruits  Servings: 4 to 5 a day  A serving is:    1 medium fruit    One-quarter cup dried fruit    Half a cup fresh, frozen, or canned fruit    Half a cup of 100% fruit juices  Best choices: A variety of fresh fruits of different colors. Whole fruits are a better choice than fruit juices. Low-fat or fat-free dairy  Servings: 2 to 3 a day  A serving is:    1 cup milk    1 cup yogurt    One and a half ounces cheese  Best choices: Skim or 1% milk, low-fat or fat-free yogurt or buttermilk, and low-fat cheeses.         Lean meats, poultry, fish  Servings: 6 or fewer a day  A serving is:    1 ounce cooked meats, poultry, or fish    1 egg  Best choices: Lean poultry and fish. Trim away visible fat. Broil, grill, roast, or boil instead of frying. Remove skin from poultry before eating. Limit how much red meat you eat.  Nuts, seeds, beans  Servings: 4 to 5 a week  A serving is:    One-third cup nuts (one and a half ounces)    2 tablespoons nut butter or seeds    Half a cup cooked dry beans or legumes  Best choices: Dry roasted nuts with no salt added, lentils, kidney beans, garbanzo beans, and whole daugherty beans.   Fats and oils  Servings: 2 to 3 a day  A serving is:    1 teaspoon vegetable oil    1 teaspoon soft margarine    1 tablespoon mayonnaise    2 tablespoons salad dressing  Best choices: Nut and vegetable oils (nontropical vegetable oils), such as olive and canola oil. Sweets  Servings: 5 a week or fewer  A serving is:    1 tablespoon sugar, maple syrup, or honey    1  tablespoon jam or jelly    1 half-ounce jelly beans (about 15)    1 cup lemonade  Best choices: Dried fruit can be a satisfying sweet. Choose low-fat sweets. And watch your serving sizes!      For more on the DASH eating plan, visit:  www.nhlbi.nih.gov/health/health-topics/topics/dash   Date Last Reviewed: 6/1/2016 2000-2019 [employer]. [address] [place] [address], [place] [id number]. All rights reserved. This information is not intended as a substitute for professional medical care. Always follow your healthcare professional's instructions.

## 2021-06-21 NOTE — PROGRESS NOTES
ASSESSMENT and PLAN:  1. Routine general medical examination at a health care facility  She declined a flu shot.  A fasting blood sugar will be checked today.  She is otherwise up-to-date on age-appropriate cancer screening and immunizations.  - Comprehensive Metabolic Panel    2. Essential hypertension  This is a new diagnosis.  Looking back, she did have another documented hypertensive blood pressure reading.  We discussed a trial of diet and lifestyle changes and follow-up versus starting medication.  She would prefer to start medication.  I am getting an EKG to evaluate for left ventricular hypertrophy.  I will start her on chlorthalidone.  Medication side effects were discussed.  I have asked her to follow-up in 2-3 weeks for a recheck.  - Electrocardiogram Perform - Clinic  - Comprehensive Metabolic Panel  - Lipid Cascade  - Thyroid Stimulating Hormone (TSH)  - chlorthalidone (HYGROTEN) 25 MG tablet; Take 1 tablet (25 mg total) by mouth daily.  Dispense: 30 tablet; Refill: 1    3. Elevated cholesterol  She is fasting today.  I am repeating her cholesterol.  - Lipid Cascade  - Thyroid Stimulating Hormone (TSH)    4. Breast adenoma, left  I have ordered the 2-year follow-up to confirm stability.  She will schedule this.  - US Breast Limited (Focal) Left; Future        Patient Instructions   EKG and labs today.    I sent in chlorthalidone for your blood pressure.  Please schedule a 2-3 week follow up for a blood pressure check and non-fasting labs.    Today's labs will be available on [employer].  If you aren't signed up, then we'll mail them out.  If anything needs more immediate follow up, we'll also call.    Please set up your left breast ultrasound:  [phone]    Take care!        Orders Placed This Encounter   Procedures     US Breast Limited (Focal) Left     Standing Status:   Future     Standing Expiration Date:   11/15/2019     Order Specific Question:   Reason for Exam (Describe Symptoms):     Answer:    "f/u probable adenoma for 2 yr stablility     Order Specific Question:   Is the patient pregnant?     Answer:   No     Order Specific Question:   Can the procedure be changed per Radiologist protocol?     Answer:   Yes     Comprehensive Metabolic Panel     Lipid Cascade     Order Specific Question:   Fasting is required?     Answer:   Yes     Thyroid Stimulating Hormone (TSH)     Electrocardiogram Perform - Clinic     There are no discontinued medications.    Return in about 3 weeks (around 12/6/2018).    ASSESSED PROBLEMS:  Problem List Items Addressed This Visit     None      Visit Diagnoses     Routine general medical examination at a health care facility    -  Primary    Relevant Orders    Comprehensive Metabolic Panel    Essential hypertension        Relevant Medications    chlorthalidone (HYGROTEN) 25 MG tablet    Other Relevant Orders    Electrocardiogram Perform - Clinic (Completed)    Comprehensive Metabolic Panel    Lipid Cascade    Thyroid Stimulating Hormone (TSH)    Elevated cholesterol        Relevant Orders    Lipid Cascade    Thyroid Stimulating Hormone (TSH)    Breast adenoma, left        Relevant Orders    US Breast Limited (Focal) Left          CHIEF COMPLAINT:  Chief Complaint   Patient presents with     Annual Exam     Fasting- Concerns regarding high bp       HISTORY OF PRESENT ILLNESS:  Zoila Mcneal \"Christine\" is a 33 y.o. female is presenting to the clinic today for an annual exam.     She is concerned about her blood pressure.  She recalls having issues with blood pressure during her pregnancies however never required treatment.  Her father has hypertension.  She checks her blood pressures at home and she said sometimes her numbers are high and other times they are in a more normal range.    She also wondered about a follow-up for a breast ultrasound.  She had a probable fibroadenoma identified 2 years ago and a follow-up ultrasound at the two-year saul was recommended to confirm " "stability.    Health Maintenance:  She has her eyes examined regularly and visits the dentist on a regular basis.   Mammogram: n/a  Colonoscopy: n/a  Pap Smear: 16, nl w/ co-testing    REVIEW OF SYSTEMS:   She denies nipple discharge and drainage.  All other systems are negative.    PFSH:  No past medical history on file.  Past Surgical History:   Procedure Laterality Date      SECTION, LOW TRANSVERSE       Family History   Problem Relation Age of Onset     Hypertension Father      Diabetes Neg Hx      Breast cancer Neg Hx      Colon cancer Neg Hx      Heart disease Neg Hx      Clotting disorder Neg Hx      Social History     Socioeconomic History     Marital status:      Spouse name: Not on file     Number of children: Not on file     Years of education: Not on file     Highest education level: Not on file   Social Needs     Financial resource strain: Not on file     Food insecurity - worry: Not on file     Food insecurity - inability: Not on file     Transportation needs - medical: Not on file     Transportation needs - non-medical: Not on file   Occupational History     Not on file   Tobacco Use     Smoking status: Never Smoker     Smokeless tobacco: Never Used   Substance and Sexual Activity     Alcohol use: No     Drug use: No     Sexual activity: Yes     Partners: Male   Other Topics Concern     Not on file   Social History Narrative     Not on file       VITALS:  Vitals:    11/15/18 0936   BP: (!) 160/100   Patient Site: Right Arm   Patient Position: Sitting   Cuff Size: Adult Large   Pulse: 84   Weight: 170 lb 6.4 oz (77.3 kg)   Height: 5' 3\" (1.6 m)     Wt Readings from Last 3 Encounters:   11/15/18 170 lb 6.4 oz (77.3 kg)   17 170 lb 9 oz (77.4 kg)   16 178 lb (80.7 kg)       PHYSICAL EXAM:  Constitutional:  Reveals an alert, pleasant adult female.   Vitals:  Noted.   Head: Normocephalic, without obvious abnormality, atraumatic   Ears: TM's normal bilaterally   Eyes: PERRL, " conjunctiva/corneas clear, EOM's intact   Throat: Lips, mucosa, and tongue normal; teeth and gums normal   Neck: Normal ROM, no carotid bruits, no thyromegaly   Lungs: Clear to auscultation bilaterally, respirations unlabored.   Breast exam:  Normal skin overlying her breasts bilaterally no discrete nodule is palpable in the axilla bilaterally  Heart: Regular rate and rhythm, S1 and S2 normal, no murmur, rub, or gallop,   Abdomen: Soft, non-tender, bowel sounds active all four quadrants, no masses, no organomegaly   Extremities: Extremities normal, atraumatic, no cyanosis or edema   Pelvic:Not examined  Skin: Skin color, texture, turgor normal, no rashes or lesions   Neurologic: Normal     MEDICATIONS:  Current Outpatient Medications   Medication Sig Dispense Refill     chlorthalidone (HYGROTEN) 25 MG tablet Take 1 tablet (25 mg total) by mouth daily. 30 tablet 1     No current facility-administered medications for this visit.

## 2021-06-23 NOTE — PROGRESS NOTES
Subjective:   Zoila Mcneal is a(n) 33 y.o. Black or  female who presents to [facility] with the following complaint(s):  Suture / Staple Removal    History of Present Illness:  Primary concern: Suture removal  Date of suture placement: 1/19/2019  Facility at which sutures were placed: [facility]  Procedure notes reviewed: Yes, in Epic  Recommended interval for suture removal: 10-14 days  Nature of wound: Cut her left forearm with scissors while unwrapping a new piece of furniture  Location of wound: Distal medial left forearm  Number of sutures placed: 7 simple interrupted sutures  Appearance: Well healed  Drainage: No  Bleeding: No  Erythema: No  Dehiscence: No  Fevers: No  Chills: No  Additional symptoms: None; denies paresthesias distal to the wound  Tobacco use / exposure: No    The following portions of the patient's history were reviewed and updated as appropriate: allergies, current medications, past family history, past medical history, past social history, past surgical history and problem list.    Review of Systems:   Review of Systems   All other systems reviewed and are negative.    Objective:     Vitals:    01/31/19 1312   BP: 125/75   Patient Site: Right Arm   Patient Position: Sitting   Cuff Size: Adult Regular   Pulse: 64   Resp: 17   Temp: 98  F (36.7  C)   TempSrc: Oral   SpO2: 96%   Weight: 164 lb (74.4 kg)     Physical Exam   Constitutional: She is oriented to person, place, and time. She appears well-developed and well-nourished.  Non-toxic appearance. No distress.   Cardiovascular: Normal rate, regular rhythm, S1 normal and S2 normal. Exam reveals no gallop and no friction rub.   No murmur heard.  Pulmonary/Chest: Effort normal and breath sounds normal. No stridor. She has no wheezes. She has no rhonchi. She has no rales.   Neurological: She is alert and oriented to person, place, and time. She has normal strength. No cranial nerve deficit or sensory deficit.   Skin:  Skin is warm and dry. Laceration (healing 2 cm linear laceration on the medial aspect of the distal left forearm) noted. No erythema. No pallor.   Nursing note and vitals reviewed.    Laboratory:  N/A    Radiology:  N/A    Electrocardiogram:  N/A    Procedure:  Laceration site was cleansed with an alcohol swab. Sutures were removed in standard fashion. No evidence of wound dehiscence.  Laceration site was again cleansed with alcohol swabs to remove debris. Laceration was reinforced with Steri Strips secured with Mastisol. Patient tolerated the procedure well. No complications were noted.      Assessment/Plan   1. Encounter for removal of sutures    2. Laceration of left forearm, subsequent encounter    - Sutures were removed as described above.   - Counseled patient regarding assessment and plan for evaluation and treatment. Questions were answered. See AVS for the specific written instructions and educational handout(s) regarding suture removal that were provided at the conclusion of the visit.   - Discussed signs / symptoms that warrant urgent / emergent medical attention.   - Follow up as needed.     Preston Frias MD

## 2021-06-28 NOTE — PROGRESS NOTES
Progress Notes by Palak Ann CNP at 3/2/2020  9:30 AM     Author: Palak Ann CNP Service: -- Author Type: Nurse Practitioner    Filed: 3/2/2020 10:39 AM Encounter Date: 3/2/2020 Status: Signed    : Palak Ann CNP (Nurse Practitioner)       FEMALE PREVENTATIVE EXAM    Assessment and Plan:       1. Encounter for health maintenance examination in adult: Patient declined the flu shot, and HIV testing today.  Pap smear completed today.  Mammogram ordered today.    2. Fibrous breast lumps: Last mammogram was in 2018.  This showed fibro glandular breast lumps.  She was due to have a repeat mammogram in 9/2018, but she reported that she felt like these areas were benign so she did not have this done.  We will repeat her mammogram this was ordered today.  She denies any symptoms today.  - Mammo Diagnostic Bilateral; Future    3. History of hypertension: Stable off of medication.  She continues to monitor her blood pressure at home and it has been within goal (140/90).  -Continue monitoring your blood pressure at home.  -Please let me know if your blood pressure continues to be over (140/90).  -Diet as discussed today (Dash diet, Mediterranean).   - Exercise as discussed today.    4. Special screening examination for human papillomavirus (HPV): Last Pap was negative for HPV in 2016.  Will repeat Pap today.  - Gynecologic Cytology (PAP Smear)    5. Screening for endocrine, nutritional, metabolic and immunity disorder: Patient is fasting today.  Will check labs today.  - Thyroid Stimulating Hormone (TSH)  - Comprehensive Metabolic Panel  - HM1(CBC and Differential)    6. Screening for cholesterol level: Patient is fast today we will check lipid profile.  - Lipid Profile    Next follow up:  Return in about 1 year (around 3/2/2021) for Annual physical.    Immunization Review  Adult Imm Review: Declines immunizations today    I discussed the following with the patient:   Adult  Healthy Living: Importance of regular exercise  Healthy nutrition    Subjective:   Chief Complaint: Zoila Mcnela is an 34 y.o. female here for a preventative health visit.     HPI: Patient presents today to [facility] care and for her yearly on this visit.  She reports currently going to school for design, taking care of her 3 children, and working time as a retail  for clothing.  She reports being very busy.  She reports overall feeling quite well, and has no concerns today.    She reports stopping the chlorthalidone she was previously prescribed for hypertension over a year ago.  She reports that she continues to take her blood pressure on a daily basis, and it has been steadily running between 130-140/70-80.  She reports that she is fasting today and would like lab work done today.    She reports that with her last child she was pumping a lot.  She reports since then having some Fibroglandular densities, and has had screening mammograms done in the past.  She reports her last mammogram was in 2018.  She reports that she thinks she is due for another one.    She also reports that she thinks she is due for a Pap smear today, she reports her last menstrual period was on 2/9/2020, she is due soon again for this.  She denies currently being sexually active.  She reports that she currently is  from her .    She reports currently exercising 3 times per week when she is able.  She reports doing weight machines at the gym and cardio, treadmill or elliptical for 30 minutes at a time.    She reports that her diet has been poor, she has been eating a lot of junk food.  But she knows that she needs to get back eating healthier.    Healthy Habits  Are you taking a daily aspirin? No  Do you typically exercising at least 40 min, 3-4 times per week?  Yes  Do you usually eat at least 4 servings of fruit and vegetables a day, include whole grains and fiber and avoid regularly eating high fat  "foods? Yes  Have you had an eye exam in the past two years? Yes  Do you see a dentist twice per year? NO  Do you have any concerns regarding sleep? No    Safety Screen  If you own firearms, are they secured in a locked gun cabinet or with trigger locks? NO  Do you feel you are safe where you are living?: Yes (3/2/2020  9:38 AM)  Do you feel you are safe in your relationship(s)?: Yes (3/2/2020  9:38 AM)      Review of Systems:  Please see above.  The rest of the review of systems are negative for all systems.     Pap History:   Yes - updated in Problem List and Health Maintenance accordingly  Cancer Screening       Status Date      PAP SMEAR Overdue 1/13/2019      Done 1/13/2016 GYNECOLOGIC CYTOLOGY (PAP SMEAR)          Patient Care Team:  Palak Ann CNP - General      History     Reviewed By Date/Time Sections Reviewed    Daniel Ley CMA 3/2/2020  9:38 AM Tobacco            Objective:   Vital Signs:   Visit Vitals  /80   Ht 5' 2.68\" (1.592 m)   Wt 164 lb 4 oz (74.5 kg)   LMP 02/09/2020 (Exact Date)   BMI 29.40 kg/m           PHYSICAL EXAM  General appearance: alert, appears stated age and cooperative  Head: Normocephalic, without obvious abnormality, atraumatic  Eyes: conjunctivae/corneas clear. PERRL, EOM's intact. Fundi benign.  Ears: normal TM's and external ear canals both ears  Nose: Nares normal. Septum midline. Mucosa normal. No drainage or sinus tenderness.  Throat: lips, mucosa, and tongue normal; teeth and gums normal  Neck: no adenopathy, no carotid bruit, no JVD, supple, symmetrical, trachea midline and thyroid not enlarged, symmetric, no tenderness/mass/nodules  Lungs: clear to auscultation bilaterally  Breasts: normal appearance, no masses or tenderness, No nipple retraction or dimpling, No axillary or supraclavicular adenopathy  Heart: regular rate and rhythm, S1, S2 normal, no murmur, click, rub or gallop  Abdomen: soft, non-tender; bowel sounds normal; no masses,  no " organomegaly  Pelvic: cervix normal in appearance, external genitalia normal, no adnexal masses or tenderness, no cervical motion tenderness, rectovaginal septum normal, uterus normal size, shape, and consistency and vagina normal without discharge  Extremities: extremities normal, atraumatic, no cyanosis or edema  Pulses: 2+ and symmetric  Skin: Skin color, texture, turgor normal. No rashes or lesions  Lymph nodes: Cervical, supraclavicular, and axillary nodes normal.  Neurologic: Alert and oriented X 3, normal strength and tone. Normal symmetric reflexes. Normal coordination and gait         Medication List          Accurate as of March 2, 2020 10:38 AM. If you have any questions, ask your nurse or doctor.            STOP taking these medications    chlorthalidone 25 MG tablet  Also known as:  HYGROTEN  Stopped by:  Palak Ann CNP            Additional Screenings Completed Today: Mammogram ordered.

## 2021-07-03 NOTE — ADDENDUM NOTE
Addendum Note by Lena Castillo at 3/16/2017 10:43 AM     Author: Lena Castillo Service: -- Author Type: Physician    Filed: 3/16/2017 10:43 AM Encounter Date: 3/16/2017 Status: Signed    : Lena Castillo    Addended by: LENA CASTILLO on: 3/16/2017 10:43 AM        Modules accepted: Orders